# Patient Record
Sex: FEMALE | Race: WHITE
[De-identification: names, ages, dates, MRNs, and addresses within clinical notes are randomized per-mention and may not be internally consistent; named-entity substitution may affect disease eponyms.]

---

## 2021-09-16 ENCOUNTER — HOSPITAL ENCOUNTER (OUTPATIENT)
Dept: HOSPITAL 61 - PNCL | Age: 71
Discharge: HOME | End: 2021-09-16
Attending: ANESTHESIOLOGY
Payer: MEDICARE

## 2021-09-16 DIAGNOSIS — M51.17: Primary | ICD-10-CM

## 2021-09-16 DIAGNOSIS — I10: ICD-10-CM

## 2021-09-16 DIAGNOSIS — Z79.899: ICD-10-CM

## 2021-09-16 DIAGNOSIS — Z98.890: ICD-10-CM

## 2021-09-16 DIAGNOSIS — M19.90: ICD-10-CM

## 2021-09-16 DIAGNOSIS — M54.5: ICD-10-CM

## 2021-09-16 DIAGNOSIS — Z85.038: ICD-10-CM

## 2021-09-16 DIAGNOSIS — E11.9: ICD-10-CM

## 2021-09-16 DIAGNOSIS — M48.07: ICD-10-CM

## 2021-09-16 DIAGNOSIS — M79.605: ICD-10-CM

## 2021-09-16 PROCEDURE — G0463 HOSPITAL OUTPT CLINIC VISIT: HCPCS

## 2021-09-16 PROCEDURE — 62323 NJX INTERLAMINAR LMBR/SAC: CPT

## 2021-09-16 NOTE — PDOC4
Procedure Note:


ICD 10 Code:


ICD 10 Code:


M 54.17


M 51.87


M 48.07





Procedure Note:


Patient was consented for lumbar epidural steroid injection with fluoroscopic 

guidance.  Risks were discussed including but not limited to: Bleeding, 

infection, possibility of epidural hematoma and subsequent neurological 

compromise, dural puncture, headaches, spinal cord and/or nerve damage, side 

effects of steroid medication, and poor results regarding pain control.  Patient

understands and wished to proceed.


Procedure is lumbar epidural steroid injection under local anesthetic using s

terile prep and drape at the L5-S1 level using C-arm fluoroscopic guidance in 

both AP and lateral views medications injected is 120 mg Depo-Medrol +10mL 

preservative-free normal saline and 2 mL contrast- condition at discharge is 

stable patient tolerated procedure well had no complications.











NISHA ANDERSEN MD               Sep 16, 2021 12:27

## 2021-09-16 NOTE — PDOC1
INITIAL PAIN CONSULT


DATE OF SERVICE:


DOS:


DATE: 9/16/21 


TIME: 12:21





CHIEF COMPLAINT:


Chief Complaint:


Low back and left lower extremity pain





HISTORY OF PRESENT ILLNESS:


71-year-old female presents with history of pain low back left lower extremity 

for about 3 years now worsening over the past 6 months without any specific 

injury or accident that she is aware but is been getting worse with walking 

standing changing positions sitting for prolonged periods with pain across the 

low back and the left lower extremity now radiating in the posterior gluteus 

posterior thigh posterior calf on the left side patient reports has had low back

pain on and off for many years but now it is getting in the left leg and, more 

severe in the back as well as the leg patient has tried physical therapy in the 

past also doing exercise currently daily is walking but without significant 

decrease in pain.  Patient reports awakens her from sleep intermittently but 

about 3 times a night does not affect her bowel bladder control but does affect 

her ability to walk she has to sit down about every 15 to 20 minutes when she is

standing or walking to decrease the pain.  Patient rates her disability rating 

0-10 10 me the worst is a 7 with family muscles and recreation 5 with social 

activity to with occupation to with self-care and fourth life support 

activities.  Patient have an MRI scan lumbar spine showing L5-S1 disc base 

narrowing with diffuse bulging annulus and a focal right paracentral disc 

protrusion narrowing the right lateral recess with mass-effect upon the right S1

nerve root.  Patient reports no complete motor loss but significant weakness of 

the left lower extremity with ambulation and standing.





PAST MEDICAL HISTORY:


PMH:


Hypertension, type 2 diabetes, hearing loss, arthritis, colon cancer





PREVIOUS SURGERIES:


Past Surgical Hx:


Colon resection 2010, right ankle surgery, right wrist surgery





CURRENT MEDICATIONS:


Current Meds:





Active Scripts








 Medications  Dose


 Route/Sig


 Max Daily Dose Days Date Category Dose


Instructions


 


 Zyrtec


  (Cetirizine Hcl)


 10 Mg Tablet  1 Tab


 PO DAILY


   9/16/21 Reported 


 


 Vitamin D3 **


  (Vitamin D) 125


 Mcg Capsule  1,000 Mcg


 PO DAILY


   9/16/21 Reported  5,000 UNITS = 125 MCG


 


 Biotin 5,000 Mcg


 Tab.rapdis  2 Tab


 PO DAILY


  30 9/16/21 Reported 


 


 Pantoprazole


 Sodium  **


  (Pantoprazole


 Sodium) 40 Mg


 Tablet.dr  40 Mg


 PO DAILYAC


   9/16/21 Reported 


 


 Actos


  (Pioglitazone


 Hcl) 30 Mg Tablet  30 Mg


 PO DAILY


   9/16/21 Reported 


 


 Metformin Hcl 500


 Mg Tablet  500 Mg


 PO BIDWMEALS


   9/16/21 Reported 


 


 Jardiance


  (Empagliflozin)


 25 Mg Tablet  25 Mg


 PO DAILY


   9/16/21 Reported 


 


 Lisinopril-Hctz


 20-12.5 Mg Tab


  (Lisinopril/Hydrochlorothiazide)


 1 Each Tablet  1 Tab


 PO BID


   9/16/21 Reported 


 


 Levothyroxine


  (Levothyroxine


 Sodium) 112 Mcg


 Capsule  112 Mcg


 PO DAILY


   9/16/21 Reported 


 


 Hydrochlorothiazide


 Tablet


  (Hydrochlorothiazide)


 12.5 Mg Tablet  12.5 Mg


 PO DAILY


   9/16/21 Reported 


 


 Diclofenac Sodium


 75 Mg Tablet.dr  1 Tab


 PO BID


   9/16/21 Reported 


 


 Atorvastatin


 Calcium 40 Mg


 Tablet  1 Tab


 PO DAILY


   9/16/21 Reported 











FAMILY HISTORY:


Family Hx:


Arthritis, diabetes, cancers





SOCIAL HISTORY:


Social Hx:


Patient is under alcohol does not smoke says any illegal illicit or recreational

drugs is single lives locally in [facility] he works as a legal 

assistant





REVIEW OF SYSTEMS:


ROS:


Positive for those items mentioned in history of present illness, all systems 

are reviewed, otherwise negative ,and are complete full and well-documented on 

patient's chart.





PHYSICAL EXAM:


VS:


Blood pressure is 156/83 pulse 69 respirations 18 temperature 97.90 Fahrenheit 

height is 5 feet 6 inches weight is 205 pounds


PE:


PHYSICAL EXAMINATION:





GENERAL: The patient is awake, alert, oriented, appropriate, very pleasant in 

demeanor


HEENT: Shows normocephalic, atraumatic.  Extraocular movements are intact and 

symmetrical.  Oral cavity: Mucous membranes moist and pink.  Dentition is inta

ct.


NECK: Shows anterior throat supple without palpable lymphadenopathy noted.  

Swallow reflex symmetrical.


CHEST: Shows normal on inspection.  Breath sounds are clear bilaterally, distant

no rales rhonchi or wheezes auscultated.


HEART: Shows S1, S2 clear.  No murmurs auscultated.


ABDOMEN: Soft, nontender, nondistended, obese.  No palpable organomegaly is 

noted.  No rebound or guarding demonstrated.


BACK: Shows spine grossly in the midline.  Normal-appearing cervical lordotic 

curvature.  There is increased thoracic kyphosis, some minor flattening of the 

lumbar lordotic curvature.  Lumbar paraspinous muscles show symmetrical on inspe

ction, on palpation shows some moderate tenderness diffusely throughout the 

upper, middle and lower distribution of the paraspinous muscles bilaterally and 

also into the lower thoracic paraspinous musculature, firm and tender, but 

without specific trigger points, without radiation of pain.  The patient has 

good rotational motion of the lumbar spine, both laterally as well as extension 

and flexion without significant difficulty.  No tenderness over the spinous 

processes, sacrum or sacroiliac regions.


EXTREMITIES: Lower extremities show deep tendon reflexes 2+ in the patellar and 

tendo calcaneus tendons.  Motor exam is 5 on a scale of 5 with right 

dorsiflexion, extension, quadriceps and hamstring flexion and 4/5 on the left.  

Peripheral pulses are 1+ posterior tibial.  No peripheral edema is noted 

bilaterally.  Lower extremities are warm and dry to touch, equal in color and 

appearance.  Straight leg raise noted to be negative bilaterally.  Gaenslen's 

and James's maneuvers are negative bilaterally as well.  The patient is able 

to stand, stand on her toes that significant difficulty loss of balance walks 

with a slight favoring gait does appear to favor the left lower extremity 

slightly with a short walk in the office today does not use any assistive device

such as canes or walker to ambulate.


SKIN: Shows warm and dry, good turgor.  No edema.  No sores, rashes or bruising 

throughout.





IMPRESSION:


Impression:


71-year-old female with 3-year history of low back pain now 6 months of inc

reasing pain left lower extremity and radicular fashion.


MRI scan lumbar spine as noted


Hypertension


Arthritis


Type 2 diabetes


Colon cancer history





Plan: Options were discussed with the patient including conservative medical 

management physical therapies interventional techniques.  Patient would like to 

pursue interventional techniques.  We discussed a lumbar epidural steroid inje

ction using description as well as anatomical models described the procedure.  

Risks were discussed including but not limited to: Bleeding, infection, 

possibility of epidural hematoma and subsequent neurological compromise, dural 

puncture, headaches, spinal cord and/or nerve damage, side effects of steroid 

medication, and poor results regarding pain control.  Patient understands and 

wished to proceed.  Patient will return to the clinic in approximate 2 weeks for

follow-up, was counseled as return appointment activity level and side effects 

be aware.





Procedure is lumbar epidural steroid injection under local anesthetic using 

sterile prep and drape at the L5-S1 level using C-arm fluoroscopic guidance in 

both AP and lateral views medications injected is 120 mg Depo-Medrol +10mL 

preservative-free normal saline and 2 mL contrast- condition at discharge is 

stable patient tolerated procedure well had no complications.











NISHA ANDERSEN MD               Sep 16, 2021 12:26

## 2021-09-30 ENCOUNTER — HOSPITAL ENCOUNTER (OUTPATIENT)
Dept: HOSPITAL 61 - PNCL | Age: 71
Discharge: HOME | End: 2021-09-30
Attending: ANESTHESIOLOGY
Payer: MEDICARE

## 2021-09-30 DIAGNOSIS — M51.16: Primary | ICD-10-CM

## 2021-09-30 DIAGNOSIS — Z79.84: ICD-10-CM

## 2021-09-30 DIAGNOSIS — Z98.890: ICD-10-CM

## 2021-09-30 DIAGNOSIS — M48.061: ICD-10-CM

## 2021-09-30 DIAGNOSIS — Z79.899: ICD-10-CM

## 2021-09-30 PROCEDURE — 62323 NJX INTERLAMINAR LMBR/SAC: CPT

## 2021-09-30 NOTE — PDOC4
Procedure Note:


ICD 10 Code:


ICD 10 Code:


M54.17


M4 8.07


Five 1.87





Procedure Note:


Patient is consented for lumbar epidural steroid injection with fluoroscopic 

guidance.  Risks were discussed including but not limited to: Bleeding, 

infection, possibility of epidural hematoma and subsequent neurological 

compromise, dural puncture, headaches, spinal cord and/or nerve damage, side 

effects of steroid medication, and poor results regarding pain control.  Patient

understands and wished to proceed.


Procedure is lumbar epidural steroid injection under local anesthetic using s

terile prep and drape at the L5 S1 level using C-arm fluoroscopic guidance in 

both AP and lateral views medications injected is 120 mg Depo-Medrol +10mL 

preservative-free normal saline and 2 mL contrast- condition at discharge is 

stable patient tolerated procedure well had no complications.











NISHA ANDERSEN MD               Sep 30, 2021 08:55

## 2021-09-30 NOTE — PDOC
Progress Note - Pain Clinic


Date of Service:


DOS:


DATE: 9/30/21 


TIME: 08:52





Diagnosis:


Dx:


Lumbar radiculopathy with lumbar degenerative disease lumbar spinal stenosis





History or Present Illness:


HPI:


71-year-old female returns for follow-up status post lumbar epidural steroid 

traction x1.  Patient reports about 70% improved initially now producing to 

about 50% in the low back and left lower extremity posterior gluteus posterior 

thigh posterior calf patient reports doing much better reports a new finding of 

some hot flashes since her last injection and increased urinary frequency 

otherwise leg is doing much better on the left side patient reports still waking

her from sleep over the past few days but initially was doing much better with 

distance walking doing household activities work activities try with greater 

ease and comfort sleeping much better again now is beginning to return patient 

rates her pain a 7 on scale 10 is worse over the past week for an average to its

least is a 4 today patient report is aching and shooting on and off in intensity

again better with sitting or laying down but worse with walking standing 

changing positions.  Patient reports no bowel or bladder incontinence.





Physical Exam:


VS:


Blood pressure is 147/79 pulse 63 respirations 16 temperature 98.1 F weight is 

204 pounds.


PE:


PHYSICAL EXAMINATION:





GENERAL: The patient is awake, alert, oriented, appropriate, very pleasant in 

demeanor


HEENT: Shows normocephalic, atraumatic.  Extraocular movements are intact and 

symmetrical.  Oral cavity: Mucous membranes moist and pink.  Dentition is 

intact.


NECK: Shows anterior throat supple without palpable lymphadenopathy noted.  

Swallow reflex symmetrical.


CHEST: Shows normal on inspection.  Breath sounds are clear bilaterally, no 

rales or rhonchi.


HEART: Shows S1, S2 clear.  No murmurs auscultated.


ABDOMEN: Soft, nontender, nondistended, obese.  No palpable organomegaly is 

noted. 


BACK: Shows spine grossly in the midline.  Normal-appearing cervical lordotic c

urvature.  There is increased thoracic kyphosis, some mild flattening of the 

lumbar lordotic curvature.  Lumbar paraspinous muscles show symmetrical on 

inspection, on palpation shows some moderate tenderness diffusely throughout the

upper, middle and lower distribution of the paraspinous muscles, but without 

specific trigger points, without radiation of pain.  The patient has good 

rotational motion of the lumbar spine, both laterally as well as extension and 

flexion without significant difficulty.  No tenderness over the spinous 

processes, sacrum or sacroiliac regions.


EXTREMITIES: Lower extremities show deep tendon reflexes 2 in the patellar and 

tendo calcaneus tendons.  Motor exam is 5 on a scale of 5 with right 

dorsiflexion, extension, quadriceps and hamstring flexion and 4/5 on the left.  

Peripheral pulses are 1+ posterior tibial.  No peripheral edema is noted 

bilaterally.  Lower extremities are warm and dry to touch, equal in color and 

appearance. 


SKIN: Shows warm and dry, good turgor.  No edema.  No sores, rashes or bruising 

throughout.





Procedure:


Procedure:


Options were discussed with the patient.  Patient chart reviews her current 

medication regimen updated current review of systems updated today as well.  We 

will proceed with a lumbar epidural steroid injection today with fluoroscopic 

guidance.  Risks were discussed including but not limited to: Bleeding, 

infection, possibility of epidural hematoma and subsequent neurological 

compromise, dural puncture, headaches, spinal cord and/or nerve damage, side 

effects of steroid medication, and poor results regarding pain control.  Patient

understands and wished to proceed.  Patient will return to clinic in approximate

2 weeks for follow-up, was counseled return appointment, activity level, and 

side effect to be aware of.





Medication Injected:


Med Injected:


Procedure is lumbar epidural steroid injection under local anesthetic using 

sterile prep and drape at the L5-S1 level using C-arm fluoroscopic guidance in 

both AP and lateral views medications injected is 120 mg Depo-Medrol +10mL 

preservative-free normal saline and 2 mL contrast- condition at discharge is 

stable patient tolerated procedure well had no complications.





Condition at Discharge:


Condition at Discharge:


Condition at discharge is stable, patient tolerated procedure well and had no 

complications.











NISHA ANDERSEN MD               Sep 30, 2021 08:55

## 2021-10-22 ENCOUNTER — HOSPITAL ENCOUNTER (OUTPATIENT)
Dept: HOSPITAL 61 - PNCL | Age: 71
Discharge: HOME | End: 2021-10-22
Attending: ANESTHESIOLOGY
Payer: MEDICARE

## 2021-10-22 DIAGNOSIS — Z79.84: ICD-10-CM

## 2021-10-22 DIAGNOSIS — Z88.0: ICD-10-CM

## 2021-10-22 DIAGNOSIS — M48.061: ICD-10-CM

## 2021-10-22 DIAGNOSIS — Z79.899: ICD-10-CM

## 2021-10-22 DIAGNOSIS — M51.16: Primary | ICD-10-CM

## 2021-10-22 PROCEDURE — 62323 NJX INTERLAMINAR LMBR/SAC: CPT

## 2021-10-22 NOTE — PDOC4
Procedure Note:


ICD 10 Code:


ICD 10 Code:


M54.17


M4 8.07


M51.87





Procedure Note:


Patient was consented for lumbar epidural steroid injection with fluoroscopic 

guidance.  Risks were discussed including but not limited to: Bleeding, 

infection, possibility of epidural hematoma and subsequent neurological 

compromise, dural puncture, headaches, spinal cord and/or nerve damage, side 

effects of steroid medication, and poor results regarding pain control.  Patient

understands and wished to proceed.


Procedure is lumbar epidural steroid injection under local anesthetic using isidro

rile prep and drape at the L5-S1 level using C-arm fluoroscopic guidance in both

AP and lateral views medications injected is 120 mg Depo-Medrol +10mL 

preservative-free normal saline and 2 mL contrast- condition at discharge is 

stable patient tolerated procedure well had no complications.











NISHA ANDERSEN MD               Oct 22, 2021 08:43

## 2021-10-22 NOTE — PDOC
Progress Note - Pain Clinic


Date of Service:


DOS:


DATE: 10/22/21 


TIME: 08:39





Diagnosis:


Dx:


Lumbar radiculopathy with lumbar degenerative disease and lumbar spinal stenosis





History or Present Illness:


HPI:


71-year-old female returns for follow-up status post lumbar epidural steroid 

injection patient reports about 80% improved initially still about 20% improved 

over the past several weeks patient reports the pain is returning in the low 

back left lower extremity posterior gluteus posterior thigh posterior calf worse

with walking standing and laying on her stomach as well as her left side patient

reports a 6 on scale 10 is worse over the past week for an average to its least 

is a 4 today patient reports no loss of motor function no bowel or bladder 

incontinence but some significant fatigability of the left lower extremity with 

walking and standing patient reports otherwise initially she is doing much 

better not awaken her from sleep at night distance walking doing household 

activities work activities travel with greater ease and comfort all the pain 

returning now in the left lower extremity.  Patient reports no other complaints.





Physical Exam:


VS:


Blood pressure is 134/77 pulse 71 respirations 18 temperature is 90.5 F height 

5 feet 6 inches weight is 204 point


PE:


PHYSICAL EXAMINATION:





GENERAL: The patient is awake, alert, oriented, appropriate, very pleasant in 

demeanor


HEENT: Shows normocephalic, atraumatic.  Extraocular movements are intact and 

symmetrical.  Oral cavity: Mucous membranes moist and pink


NECK: Shows anterior throat supple without palpable lymphadenopathy noted.  

Swallow reflex symmetrical.


CHEST: Shows normal on inspection.  Breath sounds are clear bilaterally, distant

but no rales or rhonchi auscultated.


HEART: Shows S1, S2 clear.  No murmurs auscultated.


ABDOMEN: Soft, nontender, nondistended, obese.  No palpable organomegaly is 

noted.


BACK: Shows spine grossly in the midline.  Normal-appearing cervical lordotic 

curvature.  There is increased thoracic kyphosis, some flattening of the lumbar 

lordotic curvature.  Lumbar paraspinous muscles show symmetrical on inspection, 

on palpation shows some moderate tenderness diffusely throughout the upper, 

middle and lower distribution of the paraspinous muscles without specific 

trigger points, without radiation of pain.  The patient has good rotational 

motion of the lumbar spine, both laterally as well as extension and flexion 

without significant difficulty.  


EXTREMITIES: Lower extremities show deep tendon reflexes 2+ in the patellar and 

tendo calcaneus tendons.  Motor exam is 5 on a scale of 5 with right 

dorsiflexion, extension, quadriceps and hamstring flexion and 4/5 on the left.  

Peripheral pulses are 1+ posterior tibial.  No peripheral edema is noted 

bilaterally.  Lower extremities are warm and dry to touch, equal in color and 

appearance. 


SKIN: Shows warm and dry, good turgor.  No edema.  No sores, rashes or bruising 

throughout.





Procedure:


Procedure:


Options were discussed with patient.  Patient chart reviews her current 

medication regimen updated current review of systems updated today as well.  We 

will proceed with a lumbar epidural steroid injection today with fluoroscopic 

guidance.  Risks were discussed including but not limited to: Bleeding, 

infection, possibility of epidural hematoma and subsequent neurological 

compromise, dural puncture, headaches, spinal cord and/or nerve damage, side 

effects of steroid medication, and poor results regarding pain control.  Patient

understands and wished to proceed.  Patient return to clinic in approximate 2 

weeks for follow-up, was counseled return appointment, typical, and side effect 

to be aware of.


Also, will add new medication of gabapentin 100 mg nightly, patient was given 

instructions well side effects beware with the medication.





Medication Injected:


Med Injected:


Procedure is lumbar epidural steroid injection under local anesthetic using 

sterile prep and drape at the L5-S1 level using C-arm fluoroscopic guidance in 

both AP and lateral views medications injected is 120 mg Depo-Medrol +10mL 

preservative-free normal saline and 2 mL contrast- condition at discharge is 

stable patient tolerated procedure well had no complications.





Condition at Discharge:


Condition at Discharge:


Condition at discharge stable, paced tolerated procedure well and had no com

plications.











NISHA ANDERSEN MD               Oct 22, 2021 08:42